# Patient Record
(demographics unavailable — no encounter records)

---

## 2025-04-24 NOTE — ASSESSMENT
[FreeTextEntry1] : 1. AR/AC - Fluticasone nasal, Cetirizine 10mg, Azelastine eye drops, Montelukast 10mg   2. ?FA -  IgE and immunocap to fish and shellfish

## 2025-04-24 NOTE — PHYSICAL EXAM
[Alert] : alert [Well Nourished] : well nourished [Healthy Appearance] : healthy appearance [No Acute Distress] : no acute distress [Well Developed] : well developed [Normal Pupil & Iris Size/Symmetry] : normal pupil and iris size and symmetry [No Discharge] : no discharge [No Photophobia] : no photophobia [Sclera Not Icteric] : sclera not icteric [Normal TMs] : both tympanic membranes were normal [Normal Lips/Tongue] : the lips and tongue were normal [Normal Outer Ear/Nose] : the ears and nose were normal in appearance [Normal Tonsils] : normal tonsils [No Thrush] : no thrush [Pale mucosa] : pale mucosa [Boggy Nasal Turbinates] : boggy and/or pale nasal turbinates [Supple] : the neck was supple [Normal Rate and Effort] : normal respiratory rhythm and effort [No Crackles] : no crackles [No Retractions] : no retractions [Bilateral Audible Breath Sounds] : bilateral audible breath sounds [Normal Rate] : heart rate was normal  [Normal S1, S2] : normal S1 and S2 [No murmur] : no murmur [Regular Rhythm] : with a regular rhythm [Skin Intact] : skin intact  [No Rash] : no rash [No Skin Lesions] : no skin lesions [Normal Mood] : mood was normal [Normal Affect] : affect was normal [Alert, Awake, Oriented as Age-Appropriate] : alert, awake, oriented as age appropriate [Conjunctival Erythema] : conjunctival erythema [Suborbital Bogginess] : suborbital bogginess (allergic shiners) [Clear Rhinorrhea] : clear rhinorrhea was seen

## 2025-04-24 NOTE — HISTORY OF PRESENT ILLNESS
[de-identified] : JAMIE WAITE is a 15 year female with a history of snoring, mouth breathing, runny nose, nasal congestion, itchy skin, watery, itchy and red eyes. Patient mother she suffers from nasal allergy symptoms and watery, itchy eyes during the spring and summer, she does well in the winter and fall. Mom states it has been going on since age 8 years. She saw an allergist about 2 years ago, which skin test were done and it came back positive to multiple indoor and outdoor allergens, she then started IT which she only did it for 4 months and then mom decided to discontinue for one year since she was doing well and had no symptoms. She is on Cetirizine 10 mg with no improvement and Ketotifen eye drops. Patient also states she had a food panel skin test to multiple foods and it came back positive to peanut and shellfish which she states she eats with no issues. She did had one episode of swollen lips when she had a seafood platter at a restaurant about a week ago but mom states she eats seafood at home with no problems. Patient is here for an initial consultation and possible treatment. She complains of itchy eyes and swelling and stuffy nose. This has been going on 4 years. She has previously seen another allergist and had received shots for 3-4 months and stopped.  She is here today for her yearly follow up, three days ago she woke up with red eyes and nasal congestion, she has not taken any allergy medication.